# Patient Record
Sex: MALE | Race: WHITE | NOT HISPANIC OR LATINO | URBAN - METROPOLITAN AREA
[De-identification: names, ages, dates, MRNs, and addresses within clinical notes are randomized per-mention and may not be internally consistent; named-entity substitution may affect disease eponyms.]

---

## 2017-10-12 ENCOUNTER — TELEPHONE (OUTPATIENT)
Dept: TRANSPLANT | Facility: CLINIC | Age: 67
End: 2017-10-12

## 2017-10-12 NOTE — TELEPHONE ENCOUNTER
Unknown abo. Wants to think over PEP. Takes Ibuprofen occ-understands risks with eriberto use.Will now send all Phase 1 orders with donor pkt.

## 2017-10-12 NOTE — TELEPHONE ENCOUNTER
"[facility] BREEZE    [id number]     LIVING KIDNEY DONOR EVALUATION  Donor First Name Mike Donor MRN    Donor Last Name Remi Completed 10/9/2017 3:06 PM   1950 Record ID [id number]  BREEZE Screen PASSED      Intended Recipient  Recipient First Name Adelfo Correia Recipient MRN    Recipient Last Name Adarsh Relationship Close Friend  Recipient  1947 Recipient Diagnosis    Recipient's ABO        Donor Information  Age 66 Gender Male  Height 5' 11'' Race   Weight 205 Ethnicity Not /  BMI 28.6 Preferred Language English       Required No      Blood Type Unknown  Demographics  Home Address [address] # [phone]  City [place] Type Mobile  State [place] Alternate # [phone]  Zip Code [address] Type Mobile  Country [place] Preferred Contact day Tue  Email   Preferred Contact time 09:00 AM-11:00 AM  Donor's Medical Information  Medical History Headache (Non-Migraine)   Irritable Bowel Syndrome   Joint Pain/Arthritis NOS   Stress Tests, Normal    Torn Rotator Cuff   other (\"some neck pain sometimes\") Medications Ibuprofen  Surgical History FESS (Functional Endoscopic Sinus Surgery)   Surgery, Right Shoulder, NOS Allergies Celebrex : other (progressive dizziness)   Vioxx : other (dizziness)  Social History EtOH: Denies   Illicit Drug Use: Denies   Tobacco: Denies Self-Reported Functional Status \"I am able to participate in moderate recreational activities like golf, double tennis, dancing, throwing a baseball or football\"  Family Medical History Cancer (Grandparent)   Diabetes (denies)   Heart Disease (Father)   Hypertension (denies)   Kidney Disease (denies)   Kidney Stones (denies) Exercise Frequency Exercise (3 X per week)  Review of Organ Systems  Review of Systems Airway or Lungs: No   Blood Disorder: No   Cancer: No   Diabetes,Thyroid,Adrenal,Endocrine Disorder: No   Digestive or Liver: Yes   Heart or Circulatory System: No "   Immune Diseases: No   Kidneys and Bladder: No   Male Health: No   Muscles,Bones,Joints: Yes   Neuro: No   Psych: No  Donor's Social Information  Marital Status Single Living Accommodation Lives in rented accommodation  Level of 's or technical degree complete Living Arrangement Alone  Employment Status Part Time Concerns: health and life insurance No  Employer [employer] Concerns: job security and lost income No  Occupation        Medical Insurance Status Has medical insurance      High Risk Behavior  High Risk Behaviors Blood transfusion < 12 months. (NO)   Commercial sex < 12 months. (NO)   Illicit IV drug use < 5yrs. (NO)   Male:male sexual contact < 5yrs. (NO)   Other high risk sexual contact < 12 months. (NO)  Reason for Donation  Referral Tx Candidate Reason for Donation To help my boss Adelfo Altamirano.  Permission to Disclose Inquiry Yes Patient Comments You may let Dr. Sullivan know. I haven't told him I am willing to donate my kidney.  Donor Motivation Level Highly motivated donor      PCP Contact  PCP Name Dr. Josue Sullivan  PCP City [place] PCP State [place]  PCP Phone [phone]  Emergency Contact  First Name Adelfo First Name Dony  Last Name Adarsh Last Name dAarsh  Phone # [phone] Phone # [phone]  Relationship Friend or Other Relationship Friend or Other  Office Use  Reviewed By Jeannie Carlton 10/10/2017 8:26 AM  Admin Folder Archive  Comments Passed eval  Lost for Followup Not Checked  Extended Comments    BREEZE ID dre.transplant.combined:[email or web address].[id number] survey status completed

## 2017-10-24 DIAGNOSIS — Z00.5 TRANSPLANT DONOR EVALUATION: Primary | ICD-10-CM

## 2017-10-25 ENCOUNTER — TELEPHONE (OUTPATIENT)
Dept: TRANSPLANT | Facility: CLINIC | Age: 67
End: 2017-10-25

## 2017-10-25 ENCOUNTER — ALLIED HEALTH/NURSE VISIT (OUTPATIENT)
Dept: TRANSPLANT | Facility: CLINIC | Age: 67
End: 2017-10-25
Attending: SURGERY

## 2017-10-25 VITALS
RESPIRATION RATE: 20 BRPM | BODY MASS INDEX: 28.77 KG/M2 | HEIGHT: 70 IN | HEART RATE: 67 BPM | WEIGHT: 201 LBS | DIASTOLIC BLOOD PRESSURE: 94 MMHG | SYSTOLIC BLOOD PRESSURE: 163 MMHG

## 2017-10-25 DIAGNOSIS — Z00.5 TRANSPLANT DONOR EVALUATION: ICD-10-CM

## 2017-10-25 DIAGNOSIS — Z52.4 KIDNEY DONOR: Primary | ICD-10-CM

## 2017-10-25 LAB
ABO + RH BLD: NORMAL
ABO + RH BLD: NORMAL
ALBUMIN UR-MCNC: NEGATIVE MG/DL
APPEARANCE UR: CLEAR
BILIRUB UR QL STRIP: NEGATIVE
BLOOD BANK CMNT PATIENT-IMP: NORMAL
COLOR UR AUTO: YELLOW
CREAT SERPL-MCNC: 0.85 MG/DL (ref 0.66–1.25)
CREAT UR-MCNC: 129 MG/DL
GFR SERPL CREATININE-BSD FRML MDRD: >90 ML/MIN/1.7M2
GLUCOSE SERPL-MCNC: 86 MG/DL (ref 70–99)
GLUCOSE UR STRIP-MCNC: NEGATIVE MG/DL
HGB BLD-MCNC: 17.7 G/DL (ref 13.3–17.7)
HGB UR QL STRIP: NEGATIVE
KETONES UR STRIP-MCNC: NEGATIVE MG/DL
LEUKOCYTE ESTERASE UR QL STRIP: NEGATIVE
MICROALBUMIN UR-MCNC: 13 MG/L
MICROALBUMIN/CREAT UR: 9.77 MG/G CR (ref 0–17)
NITRATE UR QL: NEGATIVE
PH UR STRIP: 6 PH (ref 5–7)
PROT UR-MCNC: 0.08 G/L
PROT/CREAT 24H UR: 0.06 G/G CR (ref 0–0.2)
RBC #/AREA URNS AUTO: 1 /HPF (ref 0–2)
SOURCE: NORMAL
SP GR UR STRIP: 1.01 (ref 1–1.03)
SPECIMEN EXP DATE BLD: NORMAL
UROBILINOGEN UR STRIP-MCNC: 2 MG/DL (ref 0–2)
WBC #/AREA URNS AUTO: 1 /HPF (ref 0–2)

## 2017-10-25 PROCEDURE — 81001 URINALYSIS AUTO W/SCOPE: CPT | Performed by: TRANSPLANT SURGERY

## 2017-10-25 PROCEDURE — 86900 BLOOD TYPING SEROLOGIC ABO: CPT | Performed by: TRANSPLANT SURGERY

## 2017-10-25 PROCEDURE — 84156 ASSAY OF PROTEIN URINE: CPT | Performed by: TRANSPLANT SURGERY

## 2017-10-25 PROCEDURE — 82043 UR ALBUMIN QUANTITATIVE: CPT | Performed by: TRANSPLANT SURGERY

## 2017-10-25 PROCEDURE — 82565 ASSAY OF CREATININE: CPT | Performed by: TRANSPLANT SURGERY

## 2017-10-25 PROCEDURE — 85018 HEMOGLOBIN: CPT | Performed by: TRANSPLANT SURGERY

## 2017-10-25 PROCEDURE — 36415 COLL VENOUS BLD VENIPUNCTURE: CPT | Performed by: TRANSPLANT SURGERY

## 2017-10-25 PROCEDURE — 82947 ASSAY GLUCOSE BLOOD QUANT: CPT | Performed by: TRANSPLANT SURGERY

## 2017-11-01 ENCOUNTER — RECORDS - HEALTHEAST (OUTPATIENT)
Dept: LAB | Facility: CLINIC | Age: 67
End: 2017-11-01

## 2017-11-01 LAB
CHOLEST SERPL-MCNC: 211 MG/DL
FASTING STATUS PATIENT QL REPORTED: ABNORMAL
HDLC SERPL-MCNC: 48 MG/DL
LDLC SERPL CALC-MCNC: 146 MG/DL
TRIGL SERPL-MCNC: 84 MG/DL

## 2018-04-04 ENCOUNTER — TELEPHONE (OUTPATIENT)
Dept: TRANSPLANT | Facility: CLINIC | Age: 68
End: 2018-04-04

## 2018-04-04 NOTE — TELEPHONE ENCOUNTER
Checked on Mike. States since last call he went to PCP.  had tried at 1st 2 different HTN meds one at a time and neither worked. Then put him on Losartan 100mg qd for past 3 months. He's kep a log of his BP's. States his average BP's are 140's/68. Now sent Email message to Dr Tyson to see if he has any suggestions.

## 2018-04-05 ENCOUNTER — TELEPHONE (OUTPATIENT)
Dept: TRANSPLANT | Facility: CLINIC | Age: 68
End: 2018-04-05

## 2018-04-10 ENCOUNTER — RESULTS ONLY (OUTPATIENT)
Dept: OTHER | Facility: CLINIC | Age: 68
End: 2018-04-10

## 2018-04-16 LAB
A* LOCUS NMDP: NORMAL
A* LOCUS: NORMAL
A* NMDP: NORMAL
ABTEST METHOD: NORMAL
B* LOCUS NMDP: NORMAL
B* LOCUS: NORMAL
B* NMDP: NORMAL
B*: NORMAL
BW-1: NORMAL
BW-2: NORMAL
C* LOCUS NMDP: NORMAL
C* LOCUS: NORMAL
C* NMDP: NORMAL
C*: NORMAL
DPA1* NMDP: NORMAL
DPA1*: NORMAL
DPB1* NMDP: NORMAL
DPB1*: NORMAL
DQA1*LOCUS: NORMAL
DQB1* LOCUS NMDP: NORMAL
DQB1* LOCUS: NORMAL
DQB1* NMDP: NORMAL
DQB1*: NORMAL
DRB1* LOCUS NMDP: NORMAL
DRB1* LOCUS: NORMAL
DRB1* NMDP: NORMAL
DRB4* LOCUS NMDP: NORMAL
DRB4* LOCUS: NORMAL
DRB4*: NORMAL
DRSSO TEST METHOD: NORMAL

## 2018-04-20 ENCOUNTER — TELEPHONE (OUTPATIENT)
Dept: TRANSPLANT | Facility: CLINIC | Age: 68
End: 2018-04-20

## 2018-04-20 DIAGNOSIS — Z00.5 TRANSPLANT DONOR EVALUATION: Primary | ICD-10-CM

## 2018-04-20 NOTE — TELEPHONE ENCOUNTER
Mike called to say that he would l mary to be scheduled for a paired exchange eval.  I will ask Courtney to schedule

## 2018-05-10 ENCOUNTER — OFFICE VISIT (OUTPATIENT)
Dept: NEPHROLOGY | Facility: CLINIC | Age: 68
End: 2018-05-10
Attending: NURSE PRACTITIONER

## 2018-05-10 ENCOUNTER — ALLIED HEALTH/NURSE VISIT (OUTPATIENT)
Dept: TRANSPLANT | Facility: CLINIC | Age: 68
End: 2018-05-10
Attending: NURSE PRACTITIONER

## 2018-05-10 ENCOUNTER — INFUSION THERAPY VISIT (OUTPATIENT)
Dept: INFUSION THERAPY | Facility: CLINIC | Age: 68
End: 2018-05-10
Attending: INTERNAL MEDICINE

## 2018-05-10 ENCOUNTER — TRANSFERRED RECORDS (OUTPATIENT)
Dept: HEALTH INFORMATION MANAGEMENT | Facility: CLINIC | Age: 68
End: 2018-05-10

## 2018-05-10 VITALS
HEIGHT: 70 IN | WEIGHT: 214 LBS | TEMPERATURE: 97.8 F | HEART RATE: 61 BPM | RESPIRATION RATE: 20 BRPM | SYSTOLIC BLOOD PRESSURE: 159 MMHG | BODY MASS INDEX: 30.64 KG/M2 | DIASTOLIC BLOOD PRESSURE: 92 MMHG | OXYGEN SATURATION: 99 %

## 2018-05-10 DIAGNOSIS — Z00.5 TRANSPLANT DONOR EVALUATION: Primary | ICD-10-CM

## 2018-05-10 DIAGNOSIS — Z00.5 TRANSPLANT DONOR EVALUATION: ICD-10-CM

## 2018-05-10 DIAGNOSIS — Z53.9 ERRONEOUS ENCOUNTER--DISREGARD: Primary | ICD-10-CM

## 2018-05-10 DIAGNOSIS — Z00.5 EXAMINATION OF POTENTIAL DONOR OF ORGAN AND TISSUE: Primary | ICD-10-CM

## 2018-05-10 LAB
ABO + RH BLD: NORMAL
ABO + RH BLD: NORMAL
ALBUMIN SERPL-MCNC: 3.6 G/DL (ref 3.4–5)
ALP SERPL-CCNC: 104 U/L (ref 40–150)
ALT SERPL W P-5'-P-CCNC: 27 U/L (ref 0–70)
ANION GAP SERPL CALCULATED.3IONS-SCNC: 7 MMOL/L (ref 3–14)
APTT PPP: 32 SEC (ref 22–37)
AST SERPL W P-5'-P-CCNC: 24 U/L (ref 0–45)
BILIRUB DIRECT SERPL-MCNC: 0.1 MG/DL (ref 0–0.2)
BILIRUB SERPL-MCNC: 0.8 MG/DL (ref 0.2–1.3)
BLD GP AB SCN SERPL QL: NORMAL
BLOOD BANK CMNT PATIENT-IMP: NORMAL
BUN SERPL-MCNC: 14 MG/DL (ref 7–30)
CALCIUM SERPL-MCNC: 8.8 MG/DL (ref 8.5–10.1)
CHLORIDE SERPL-SCNC: 103 MMOL/L (ref 94–109)
CHOLEST SERPL-MCNC: 189 MG/DL
CO2 SERPL-SCNC: 26 MMOL/L (ref 20–32)
CREAT SERPL-MCNC: 0.93 MG/DL (ref 0.66–1.25)
ERYTHROCYTE [DISTWIDTH] IN BLOOD BY AUTOMATED COUNT: 11.8 % (ref 10–15)
GFR SERPL CREATININE-BSD FRML MDRD: 81 ML/MIN/1.7M2
GLUCOSE SERPL-MCNC: 90 MG/DL (ref 70–99)
HBA1C MFR BLD: 5.4 % (ref 0–5.6)
HCT VFR BLD AUTO: 51 % (ref 40–53)
HDLC SERPL-MCNC: 39 MG/DL
HGB BLD-MCNC: 17.9 G/DL (ref 13.3–17.7)
INR PPP: 1.05 (ref 0.86–1.14)
LDLC SERPL CALC-MCNC: 123 MG/DL
MCH RBC QN AUTO: 32.4 PG (ref 26.5–33)
MCHC RBC AUTO-ENTMCNC: 35.1 G/DL (ref 31.5–36.5)
MCV RBC AUTO: 92 FL (ref 78–100)
NONHDLC SERPL-MCNC: 150 MG/DL
PHOSPHATE SERPL-MCNC: 2.1 MG/DL (ref 2.5–4.5)
PLATELET # BLD AUTO: 218 10E9/L (ref 150–450)
POTASSIUM SERPL-SCNC: 3.9 MMOL/L (ref 3.4–5.3)
PROT SERPL-MCNC: 7.1 G/DL (ref 6.8–8.8)
PSA SERPL-ACNC: 2.9 UG/L (ref 0–4)
RBC # BLD AUTO: 5.52 10E12/L (ref 4.4–5.9)
SODIUM SERPL-SCNC: 135 MMOL/L (ref 133–144)
SPECIMEN EXP DATE BLD: NORMAL
TRIGL SERPL-MCNC: 133 MG/DL
URATE SERPL-MCNC: 3.8 MG/DL (ref 3.5–7.2)
WBC # BLD AUTO: 6.9 10E9/L (ref 4–11)

## 2018-05-10 PROCEDURE — 86901 BLOOD TYPING SEROLOGIC RH(D): CPT | Performed by: INTERNAL MEDICINE

## 2018-05-10 PROCEDURE — 86850 RBC ANTIBODY SCREEN: CPT | Performed by: INTERNAL MEDICINE

## 2018-05-10 PROCEDURE — 25000128 H RX IP 250 OP 636: Mod: ZF | Performed by: INTERNAL MEDICINE

## 2018-05-10 PROCEDURE — 80053 COMPREHEN METABOLIC PANEL: CPT | Performed by: INTERNAL MEDICINE

## 2018-05-10 PROCEDURE — 86900 BLOOD TYPING SEROLOGIC ABO: CPT | Performed by: INTERNAL MEDICINE

## 2018-05-10 RX ADMIN — IOHEXOL 5 ML: 300 INJECTION, SOLUTION INTRAVENOUS at 09:54

## 2018-05-10 ASSESSMENT — PAIN SCALES - GENERAL: PAINLEVEL: NO PAIN (0)

## 2018-05-10 NOTE — LETTER
5/10/2018       RE: Mike Khalil  [address]  [facility] [address]     Dear Colleague,    Thank you for referring your patient, Mike Khalil, to the [facility] NEPHROLOGY at [facility]. Please see a copy of my visit note below.    The patient's blood pressure remains elevated today despite his evaluation by his primary care provider.  He is on sure of what medicine he is taking for his blood pressure today.  I recommended that he return to the care of his PCP for management of his blood pressure.  He feels that he may have missed his blood pressure medication yesterday and that is the reason that his blood pressure is elevated today.  He will need to at least be able to know the names of his medicines and doses, not forget his medicines and have 100% adherence with it, and have a blood pressure at least less than 140/90 on one medication prior to him being considered for being a kidney donor.    I spoke at length with this patient regarding this and he states that he has understanding of why he is being turned down for kidney donation today.    No charge for this visit today.        Again, thank you for allowing me to participate in the care of your patient.      Sincerely,    Markie Tyson MD

## 2018-05-10 NOTE — PROGRESS NOTES
The patient's blood pressure remains elevated today despite his evaluation by his primary care provider.  He is on sure of what medicine he is taking for his blood pressure today.  I recommended that he return to the care of his PCP for management of his blood pressure.  He feels that he may have missed his blood pressure medication yesterday and that is the reason that his blood pressure is elevated today.  He will need to at least be able to know the names of his medicines and doses, not forget his medicines and have 100% adherence with it, and have a blood pressure at least less than 140/90 on one medication prior to him being considered for being a kidney donor.    I spoke at length with this patient regarding this and he states that he has understanding of why he is being turned down for kidney donation today.    No charge for this visit today.

## 2018-05-10 NOTE — MR AVS SNAPSHOT
"              After Visit Summary   5/10/2018    Mike Khalil    MRN: [id number]           Patient Information     Date Of Birth          1950        Visit Information        Provider Department      5/10/2018 8:30 AM UC 47 ATC; UC SPEC INFUSION [facility] Specialty and Procedure        Today's Diagnoses     Transplant donor evaluation    -  1       Follow-ups after your visit        Who to contact     If you have questions or need follow up information about today's clinic visit or your schedule please contact [facility] SPECIALTY AND PROCEDURE directly at [phone].  Normal or non-critical lab and imaging results will be communicated to you by SiCortexhart, letter or phone within 4 business days after the clinic has received the results. If you do not hear from us within 7 days, please contact the clinic through [facility] or phone. If you have a critical or abnormal lab result, we will notify you by phone as soon as possible.  Submit refill requests through [employer] or call your pharmacy and they will forward the refill request to us. Please allow 3 business days for your refill to be completed.          Additional Information About Your Visit        MyChart Information     [employer] lets you send messages to your doctor, view your test results, renew your prescriptions, schedule appointments and more. To sign up, go to [email or web address] . Click on \"Log in\" on the left side of the screen, which will take you to the Welcome page. Then click on \"Sign up Now\" on the right side of the page.     You will be asked to enter the access code listed below, as well as some personal information. Please follow the directions to create your username and password.     Your access code is: [email or web address]  Expires: 2018  6:30 AM     Your access code will  in 90 days. If you need help or a new code, please call your [place] clinic or [phone].        Care " EveryWhere ID     This is your Care EveryWhere ID. This could be used by other organizations to access your [place] medical records  [id number]         Blood Pressure from Last 3 Encounters:   05/10/18 (!) 159/92   10/25/17 (!) 163/94    Weight from Last 3 Encounters:   05/10/18 97.1 kg (214 lb)   10/25/17 91.2 kg (201 lb)              We Performed the Following     ABO/Rh type and screen     Bilirubin direct     CBC with platelets     Comprehensive metabolic panel     Hemoglobin A1c     INR     Lipid Profile     Partial thromboplastin time     Phosphorus     Prostate spec antigen screen     Uric acid        Primary Care Provider Fax #    Provider Not In System [phone]                Equal Access to Services     ANDREA LAWS : Hadii leah Maldonado, maykel cha, anmol lawsonalmaemmanuel cartagena, jamaal rico. So [facility] [phone].    ATENCIÓN: Si habla español, tiene a ward disposición servicios gratuitos de asistencia lingüística. [facility] [phone].    We comply with applicable federal civil rights laws and [place] laws. We do not discriminate on the basis of race, color, national origin, age, disability, sex, sexual orientation, or gender identity.            Thank you!     Thank you for choosing [facility] SPECIALTY AND PROCEDURE  for your care. Our goal is always to provide you with excellent care. Hearing back from our patients is one way we can continue to improve our services. Please take a few minutes to complete the written survey that you may receive in the mail after your visit with us. Thank you!             Your Updated Medication List - Protect others around you: Learn how to safely use, store and throw away your medicines at www.disposemymeds.org.      Notice  As of 5/10/2018  4:49 PM    You have not been prescribed any medications.

## 2018-05-10 NOTE — MR AVS SNAPSHOT
"              After Visit Summary   5/10/2018    Mike Khalil    MRN: [id number]           Patient Information     Date Of Birth          1950        Visit Information        Provider Department      5/10/2018 10:00 AM Nimo Alcocer S.A., RN [facility] Solid Organ Transplant        Today's Diagnoses     Examination of potential donor of organ and tissue    -  1       Follow-ups after your visit        Who to contact     If you have questions or need follow up information about today's clinic visit or your schedule please contact [facility] SOLID ORGAN TRANSPLANT directly at [phone].  Normal or non-critical lab and imaging results will be communicated to you by R-Healthhart, letter or phone within 4 business days after the clinic has received the results. If you do not hear from us within 7 days, please contact the clinic through [employer]-Healthhart or phone. If you have a critical or abnormal lab result, we will notify you by phone as soon as possible.  Submit refill requests through [employer] or call your pharmacy and they will forward the refill request to us. Please allow 3 business days for your refill to be completed.          Additional Information About Your Visit        MyChart Information     [employer] lets you send messages to your doctor, view your test results, renew your prescriptions, schedule appointments and more. To sign up, go to [email or web address] . Click on \"Log in\" on the left side of the screen, which will take you to the Welcome page. Then click on \"Sign up Now\" on the right side of the page.     You will be asked to enter the access code listed below, as well as some personal information. Please follow the directions to create your username and password.     Your access code is: [email or web address]  Expires: 2018  6:30 AM     Your access code will  in 90 days. If you need help or a new code, please call your [place] clinic or [phone].        Care EveryWhere ID     This is your Care " EveryWhere ID. This could be used by other organizations to access your [place] medical records  [id number]         Blood Pressure from Last 3 Encounters:   05/10/18 (!) 159/92   10/25/17 (!) 163/94    Weight from Last 3 Encounters:   05/10/18 97.1 kg (214 lb)   10/25/17 91.2 kg (201 lb)              Today, you had the following     No orders found for display       Primary Care Provider Fax #    Provider Not In System [phone]                Equal Access to Services     ANDREA LAWS : Hadii aad ku hadasho Soomaali, waaxda luqadaha, qaybta kaalmada adeegyada, jamaal jones . So [facility] [phone].    ATENCIÓN: Si habla español, tiene a ward disposición servicios gratuitos de asistencia lingüística. Llame al [phone].    We comply with applicable federal civil rights laws and [place] laws. We do not discriminate on the basis of race, color, national origin, age, disability, sex, sexual orientation, or gender identity.            Thank you!     Thank you for choosing [facility] SOLID ORGAN TRANSPLANT  for your care. Our goal is always to provide you with excellent care. Hearing back from our patients is one way we can continue to improve our services. Please take a few minutes to complete the written survey that you may receive in the mail after your visit with us. Thank you!             Your Updated Medication List - Protect others around you: Learn how to safely use, store and throw away your medicines at www.disposemymeds.org.      Notice  As of 5/10/2018  5:23 PM    You have not been prescribed any medications.

## 2018-05-10 NOTE — PROGRESS NOTES
Donor Iohexol test    Mike Khalil presents today to [facility] for a Donor Iohexol test.      Progress note:  ID verified by name and .     The following information was verified with the patient:  Female Patients is there any possibility of being pregnant No  Is there a history of allergy (skin rash, swelling, ect) to:   A.  Iodine (except skin reactions to betadine): No   B. Intravenous radio-contrast agents: No   C. Seafood No    R.N. provided patient with educational handout regarding timed test. Yes    20 gauge PIV placed in LT AC.  Iohexol administered.  Following iohexol administration extension set replaced.  PIV left in place for blood draws and CT this afternoon    Medication administered:  Iohexol (Omnipaque 300mg iodine/ml concentration) 5 mls.    Start time: 854  Stop time: 856    Drug Waste Record    Drug Name: Iohexol  Dose: 5 ml  Route administered: IV  NDC #: [id number]  Amount of waste(mL):5 ml  Reason for waste: Single use vial      Administrations This Visit     iohexol (OMNIPAQUE 300) injection 5 mL     Admin Date Action Dose Route Administered By             05/10/2018 Given 5 mL Intravenous Dodie Olmstead RN                            Evaluation nurse in transplant to draw labs at 2 and 4 hours post iohexol administration.  Patient given a slip with the times to get labs drawn and verbalized understanding of the plan.    Patient tolerated the procedure:  Yes    After the infusion patient was discharged to the next appointment.    Dodie Olmstead RN

## 2018-05-10 NOTE — NURSING NOTE
Saw Mike Khalil in clinic during kidney donor evaluation.  Has offered to be donor to Paired exchange program for friend Adelfo Altamirano.  They are incompatible by DSA.  His blood pressure readings today in clinic were elevated.  Dr Tyson met with the patient and the evaluation was stopped.  I visited with him for 20 minutes and reviewed next steps.  He will go to primary care for management of Hypertension.  He will need to demonstrate stability on the medication.  When that is completed we will schedule him back in the clinic for his donor evaluation.  The patient signed the ehFriendFinder Networksth EVANGELIST for his PCP.  I will touch base with the provider that our team has recommended that he receive treatment and demonstrate stable blood pressures.  All tests today were cancelled.    Time Spent 20 minutes.

## 2018-05-10 NOTE — MR AVS SNAPSHOT
"              After Visit Summary   5/10/2018    Mike Khalil    MRN: [id number]           Patient Information     Date Of Birth          1950        Visit Information        Provider Department      5/10/2018 1:00 PM Markie Tyson MD [facility] Nephrology        Today's Diagnoses     ERRONEOUS ENCOUNTER--DISREGARD    -  1       Follow-ups after your visit        Your next 10 appointments already scheduled     May 10, 2018 10:00 AM CDT   (Arrive by 9:45 AM)   SOT CARE COORDINATOR EVAL with Caryl Brush RN   [facility] Solid Organ Transplant ([facility])    [address] [place] [address]  [address]  [facility] [address] [phone]            May 10, 2018  1:00 PM CDT   (Arrive by 12:30 PM)   Kidney Donor Evaluation with Markie Tyson MD   [facility] Nephrology ([facility])    [address] [place] [address]  [address]  [facility] [address] [phone]              Who to contact     If you have questions or need follow up information about today's clinic visit or your schedule please contact [facility] NEPHROLOGY directly at [phone].  Normal or non-critical lab and imaging results will be communicated to you by MyChart, letter or phone within 4 business days after the clinic has received the results. If you do not hear from us within 7 days, please contact the clinic through [employer] or phone. If you have a critical or abnormal lab result, we will notify you by phone as soon as possible.  Submit refill requests through Z[employer]is or call your pharmacy and they will forward the refill request to us. Please allow 3 business days for your refill to be completed.          Additional Information About Your Visit        MyChart Information     [employer] lets you send messages to your doctor, view your test results, renew your prescriptions, schedule appointments and more. To sign up, go to [email or web address] . Click on \"Log in\" on the left side of the " "screen, which will take you to the Welcome page. Then click on \"Sign up Now\" on the right side of the page.     You will be asked to enter the access code listed below, as well as some personal information. Please follow the directions to create your username and password.     Your access code is: [email or web address]  Expires: 2018  6:30 AM     Your access code will  in 90 days. If you need help or a new code, please call your [place] clinic or [phone].        Care EveryWhere ID     This is your Care EveryWhere ID. This could be used by other organizations to access your [place] medical records  [id number]        Your Vitals Were     Pulse Temperature Respirations Height Pulse Oximetry BMI (Body Mass Index)    61 97.8  F (36.6  C) (Oral) 20 1.778 m (5' 10\") 99% 30.71 kg/m2       Blood Pressure from Last 3 Encounters:   05/10/18 (!) 159/92   10/25/17 (!) 163/94    Weight from Last 3 Encounters:   05/10/18 97.1 kg (214 lb)   10/25/17 91.2 kg (201 lb)              Today, you had the following     No orders found for display       Primary Care Provider Fax #    Provider Not In System [phone]                Equal Access to Services     PRIYA [facility] : Hadii leah ku hadasho Soomaali, waaxda luqadaha, qaybta kaalmada adeegyada, jamaal jones . So [facility] [phone].    ATENCIÓN: Si habla español, tiene a ward disposición servicios gratuitos de asistencia lingüística. Llame al [phone].    We comply with applicable federal civil rights laws and [place] laws. We do not discriminate on the basis of race, color, national origin, age, disability, sex, sexual orientation, or gender identity.            Thank you!     Thank you for choosing [facility] NEPHROLOGY  for your care. Our goal is always to provide you with excellent care. Hearing back from our patients is one way we can continue to improve our services. Please take a few minutes to complete the written survey that you may " receive in the mail after your visit with us. Thank you!             Your Updated Medication List - Protect others around you: Learn how to safely use, store and throw away your medicines at www.disposemymeds.org.      Notice  As of 5/10/2018  9:51 AM    You have not been prescribed any medications.

## 2018-05-10 NOTE — LETTER
Date:May 11, 2018      Patient was self referred, no letter generated. Do not send.        [facility] Health Information

## 2018-05-16 ENCOUNTER — COMMITTEE REVIEW (OUTPATIENT)
Dept: TRANSPLANT | Facility: CLINIC | Age: 68
End: 2018-05-16

## 2018-05-16 NOTE — COMMITTEE REVIEW
Living Donor Committee Review Note Evaluation Date: 5/10/2018  Committee Review Date: 5/16/2018    Donor being evaluated for: Kidney    Transplant Phase: Evaluation  Transplant Status: Active    Transplant Coordinator: Nimo Alcocer  Transplant Surgeon:       Committee Review Members:  Racheal, Ekta Arana RD   Nephrology Markie Tyson MD   Pharmacy Pito Moreno, [facility]    - Clinical Laura Anderson, [facility], Shahla Fine, [facility]   Transplant Caryl Brush RN, Yony Crabtree MD, Kaleigh Singh, LUIS, Aliya Schulte, N, Nimo Alcocer RN, Adelfo Mahan MD       Transplant Eligibility:     Committee Review Decision: Declined    Relative Contraindications:     Absolute Contraindications: Uncontrollable hypertension or hx of hypertension w/evidence end stage organ damage    Committee Chair Yony Crabtree MD verbally attested to the committee's decision.    Committee Discussion Details: Elevated Blood pressures on evaluation day.  History of HTN and was on one anti hypertensive medication but poor adherence was reported by the patient.  The Donor team recommends that he see PCP for Hypertension management and demonstrate stable blood pressures prior to restarting donor evaluation.  Pt is declined to be a donor.

## 2018-05-17 ENCOUNTER — TELEPHONE (OUTPATIENT)
Dept: TRANSPLANT | Facility: CLINIC | Age: 68
End: 2018-05-17

## 2018-05-17 NOTE — TELEPHONE ENCOUNTER
I called the office of Dr Sullivan to report the hypertensive readings that were obtained during the patient's evaluation day.  I explained the policy for kidney donors and that we now ask that their clinic work with patient on hypertension management.  The patient presented, she said to their clinic on 5/10/18 and a blood pressure was obtained there that was 138/78.  The nurse will inform the PCP.  I gave the nurse my contact information.

## 2019-08-05 ENCOUNTER — RECORDS - HEALTHEAST (OUTPATIENT)
Dept: LAB | Facility: CLINIC | Age: 69
End: 2019-08-05

## 2019-08-05 LAB
ALBUMIN SERPL-MCNC: 4.3 G/DL (ref 3.5–5)
ALP SERPL-CCNC: 101 U/L (ref 45–120)
ALT SERPL W P-5'-P-CCNC: 37 U/L (ref 0–45)
ANION GAP SERPL CALCULATED.3IONS-SCNC: 14 MMOL/L (ref 5–18)
AST SERPL W P-5'-P-CCNC: 41 U/L (ref 0–40)
BILIRUB SERPL-MCNC: 1 MG/DL (ref 0–1)
BUN SERPL-MCNC: 21 MG/DL (ref 8–22)
CALCIUM SERPL-MCNC: 10.2 MG/DL (ref 8.5–10.5)
CHLORIDE BLD-SCNC: 106 MMOL/L (ref 98–107)
CHOLEST SERPL-MCNC: 231 MG/DL
CO2 SERPL-SCNC: 21 MMOL/L (ref 22–31)
CREAT SERPL-MCNC: 0.95 MG/DL (ref 0.7–1.3)
FASTING STATUS PATIENT QL REPORTED: ABNORMAL
GFR SERPL CREATININE-BSD FRML MDRD: >60 ML/MIN/1.73M2
GLUCOSE BLD-MCNC: 91 MG/DL (ref 70–125)
HDLC SERPL-MCNC: 51 MG/DL
LDLC SERPL CALC-MCNC: 156 MG/DL
POTASSIUM BLD-SCNC: 4.5 MMOL/L (ref 3.5–5)
PROT SERPL-MCNC: 7.3 G/DL (ref 6–8)
PSA SERPL-MCNC: 3.2 NG/ML (ref 0–4.5)
SODIUM SERPL-SCNC: 141 MMOL/L (ref 136–145)
TRIGL SERPL-MCNC: 119 MG/DL

## 2019-08-06 ENCOUNTER — RECORDS - HEALTHEAST (OUTPATIENT)
Dept: LAB | Facility: CLINIC | Age: 69
End: 2019-08-06

## 2019-08-06 LAB
TSH SERPL DL<=0.005 MIU/L-ACNC: 1.18 UIU/ML (ref 0.3–5)
VIT B12 SERPL-MCNC: 294 PG/ML (ref 213–816)

## 2019-09-06 ENCOUNTER — RECORDS - HEALTHEAST (OUTPATIENT)
Dept: LAB | Facility: HOSPITAL | Age: 69
End: 2019-09-06

## 2019-09-06 ENCOUNTER — RECORDS - HEALTHEAST (OUTPATIENT)
Dept: ADMINISTRATIVE | Facility: OTHER | Age: 69
End: 2019-09-06

## 2019-09-08 LAB — METHYLMALONATE SERPL-SCNC: 0.22 UMOL/L (ref 0–0.4)

## 2019-09-09 ENCOUNTER — RECORDS - HEALTHEAST (OUTPATIENT)
Dept: ADMINISTRATIVE | Facility: OTHER | Age: 69
End: 2019-09-09

## 2019-09-09 ENCOUNTER — AMBULATORY - HEALTHEAST (OUTPATIENT)
Dept: ADMINISTRATIVE | Facility: REHABILITATION | Age: 69
End: 2019-09-09

## 2019-09-09 DIAGNOSIS — H81.10 BPPV (BENIGN PAROXYSMAL POSITIONAL VERTIGO): ICD-10-CM

## 2019-09-09 LAB — VIT B1 PYROPHOSHATE BLD-SCNC: 155 NMOL/L (ref 70–180)

## 2019-09-11 ENCOUNTER — AMBULATORY - HEALTHEAST (OUTPATIENT)
Dept: NEUROLOGY | Facility: CLINIC | Age: 69
End: 2019-09-11

## 2019-09-11 ENCOUNTER — HOSPITAL ENCOUNTER (OUTPATIENT)
Dept: MRI IMAGING | Facility: HOSPITAL | Age: 69
Discharge: HOME | End: 2019-09-11
Attending: PSYCHIATRY & NEUROLOGY

## 2019-09-11 DIAGNOSIS — R48.2 GAIT APRAXIA: ICD-10-CM

## 2019-09-11 DIAGNOSIS — R41.89 SUBJECTIVE MEMORY COMPLAINTS: ICD-10-CM

## 2019-09-11 DIAGNOSIS — H81.10 BPPV (BENIGN PAROXYSMAL POSITIONAL VERTIGO): ICD-10-CM

## 2019-09-11 DIAGNOSIS — H81.10: ICD-10-CM

## 2019-09-26 ENCOUNTER — AMBULATORY - HEALTHEAST (OUTPATIENT)
Dept: BEHAVIORAL HEALTH | Facility: CLINIC | Age: 69
End: 2019-09-26

## 2019-09-26 DIAGNOSIS — R41.89 SUBJECTIVE MEMORY COMPLAINTS: ICD-10-CM

## 2019-09-26 DIAGNOSIS — F03.90: ICD-10-CM

## 2019-09-26 DIAGNOSIS — H81.10: ICD-10-CM

## 2019-09-26 DIAGNOSIS — R48.2 GAIT APRAXIA: ICD-10-CM

## 2019-09-30 ENCOUNTER — HOSPITAL ENCOUNTER (OUTPATIENT)
Dept: NEUROLOGY | Facility: HOSPITAL | Age: 69
Discharge: HOME | End: 2019-09-30
Attending: PSYCHIATRY & NEUROLOGY

## 2019-09-30 DIAGNOSIS — R41.89 SUBJECTIVE MEMORY COMPLAINTS: ICD-10-CM

## 2019-09-30 DIAGNOSIS — H81.10 BENIGN PAROXYSMAL POSITIONAL VERTIGO, UNSPECIFIED LATERALITY: ICD-10-CM

## 2019-10-02 ENCOUNTER — AMBULATORY - HEALTHEAST (OUTPATIENT)
Dept: BEHAVIORAL HEALTH | Facility: CLINIC | Age: 69
End: 2019-10-02

## 2019-10-02 DIAGNOSIS — F03.90: ICD-10-CM

## 2020-02-21 ENCOUNTER — RECORDS - HEALTHEAST (OUTPATIENT)
Dept: LAB | Facility: CLINIC | Age: 70
End: 2020-02-21

## 2020-02-21 LAB
ALBUMIN SERPL-MCNC: 3.5 G/DL (ref 3.5–5)
ALP SERPL-CCNC: 106 U/L (ref 45–120)
ALT SERPL W P-5'-P-CCNC: 27 U/L (ref 0–45)
ANION GAP SERPL CALCULATED.3IONS-SCNC: 8 MMOL/L (ref 5–18)
AST SERPL W P-5'-P-CCNC: 23 U/L (ref 0–40)
BASOPHILS # BLD AUTO: 0 THOU/UL (ref 0–0.2)
BASOPHILS NFR BLD AUTO: 0 % (ref 0–2)
BILIRUB SERPL-MCNC: 0.8 MG/DL (ref 0–1)
BUN SERPL-MCNC: 16 MG/DL (ref 8–22)
CALCIUM SERPL-MCNC: 9.5 MG/DL (ref 8.5–10.5)
CHLORIDE BLD-SCNC: 102 MMOL/L (ref 98–107)
CO2 SERPL-SCNC: 27 MMOL/L (ref 22–31)
CREAT SERPL-MCNC: 0.83 MG/DL (ref 0.7–1.3)
EOSINOPHIL # BLD AUTO: 0.2 THOU/UL (ref 0–0.4)
EOSINOPHIL NFR BLD AUTO: 1 % (ref 0–6)
ERYTHROCYTE [DISTWIDTH] IN BLOOD BY AUTOMATED COUNT: 11.9 % (ref 11–14.5)
GFR SERPL CREATININE-BSD FRML MDRD: >60 ML/MIN/1.73M2
GLUCOSE BLD-MCNC: 101 MG/DL (ref 70–125)
HCT VFR BLD AUTO: 54 % (ref 40–54)
HGB BLD-MCNC: 18.2 G/DL (ref 14–18)
LYMPHOCYTES # BLD AUTO: 1.8 THOU/UL (ref 0.8–4.4)
LYMPHOCYTES NFR BLD AUTO: 17 % (ref 20–40)
MCH RBC QN AUTO: 32.8 PG (ref 27–34)
MCHC RBC AUTO-ENTMCNC: 33.7 G/DL (ref 32–36)
MCV RBC AUTO: 97 FL (ref 80–100)
MONOCYTES # BLD AUTO: 0.6 THOU/UL (ref 0–0.9)
MONOCYTES NFR BLD AUTO: 6 % (ref 2–10)
NEUTROPHILS # BLD AUTO: 8 THOU/UL (ref 2–7.7)
NEUTROPHILS NFR BLD AUTO: 75 % (ref 50–70)
PLATELET # BLD AUTO: 264 THOU/UL (ref 140–440)
PMV BLD AUTO: 10.6 FL (ref 8.5–12.5)
POTASSIUM BLD-SCNC: 4.3 MMOL/L (ref 3.5–5)
PROT SERPL-MCNC: 6.8 G/DL (ref 6–8)
RBC # BLD AUTO: 5.55 MILL/UL (ref 4.4–6.2)
SODIUM SERPL-SCNC: 137 MMOL/L (ref 136–145)
WBC: 10.6 THOU/UL (ref 4–11)

## 2020-03-02 ENCOUNTER — RECORDS - HEALTHEAST (OUTPATIENT)
Dept: LAB | Facility: CLINIC | Age: 70
End: 2020-03-02

## 2020-03-02 LAB
ERYTHROCYTE [DISTWIDTH] IN BLOOD BY AUTOMATED COUNT: 11.7 % (ref 11–14.5)
FERRITIN SERPL-MCNC: 313 NG/ML (ref 27–300)
HCT VFR BLD AUTO: 52.3 % (ref 40–54)
HGB BLD-MCNC: 17.4 G/DL (ref 14–18)
MCH RBC QN AUTO: 33 PG (ref 27–34)
MCHC RBC AUTO-ENTMCNC: 33.3 G/DL (ref 32–36)
MCV RBC AUTO: 99 FL (ref 80–100)
PLATELET # BLD AUTO: 241 THOU/UL (ref 140–440)
PMV BLD AUTO: 11.1 FL (ref 8.5–12.5)
RBC # BLD AUTO: 5.27 MILL/UL (ref 4.4–6.2)
WBC: 7.6 THOU/UL (ref 4–11)

## 2020-03-03 ENCOUNTER — RECORDS - HEALTHEAST (OUTPATIENT)
Dept: LAB | Facility: CLINIC | Age: 70
End: 2020-03-03

## 2020-03-10 LAB — JAK2 MUTATION NGS - HISTORICAL: NORMAL

## 2020-04-03 ENCOUNTER — RECORDS - HEALTHEAST (OUTPATIENT)
Dept: LAB | Facility: CLINIC | Age: 70
End: 2020-04-03

## 2020-04-06 LAB
BASOPHILS # BLD AUTO: 0 THOU/UL (ref 0–0.2)
BASOPHILS NFR BLD AUTO: 1 % (ref 0–2)
EOSINOPHIL # BLD AUTO: 0.1 THOU/UL (ref 0–0.4)
EOSINOPHIL NFR BLD AUTO: 2 % (ref 0–6)
ERYTHROCYTE [DISTWIDTH] IN BLOOD BY AUTOMATED COUNT: 11.7 % (ref 11–14.5)
HCT VFR BLD AUTO: 53.5 % (ref 40–54)
HGB BLD-MCNC: 17.9 G/DL (ref 14–18)
LYMPHOCYTES # BLD AUTO: 1.8 THOU/UL (ref 0.8–4.4)
LYMPHOCYTES NFR BLD AUTO: 25 % (ref 20–40)
MCH RBC QN AUTO: 32.4 PG (ref 27–34)
MCHC RBC AUTO-ENTMCNC: 33.5 G/DL (ref 32–36)
MCV RBC AUTO: 97 FL (ref 80–100)
MONOCYTES # BLD AUTO: 0.4 THOU/UL (ref 0–0.9)
MONOCYTES NFR BLD AUTO: 6 % (ref 2–10)
NEUTROPHILS # BLD AUTO: 4.6 THOU/UL (ref 2–7.7)
NEUTROPHILS NFR BLD AUTO: 66 % (ref 50–70)
PLATELET # BLD AUTO: 285 THOU/UL (ref 140–440)
PMV BLD AUTO: 10.1 FL (ref 8.5–12.5)
RBC # BLD AUTO: 5.53 MILL/UL (ref 4.4–6.2)
WBC: 7 THOU/UL (ref 4–11)

## 2020-05-14 ENCOUNTER — RECORDS - HEALTHEAST (OUTPATIENT)
Dept: LAB | Facility: CLINIC | Age: 70
End: 2020-05-14

## 2020-05-15 LAB — ALBUMIN SERPL-MCNC: 3.8 G/DL (ref 3.5–5)

## 2020-07-19 ENCOUNTER — RECORDS - HEALTHEAST (OUTPATIENT)
Dept: LAB | Facility: CLINIC | Age: 70
End: 2020-07-19

## 2020-07-20 LAB
ANION GAP SERPL CALCULATED.3IONS-SCNC: 9 MMOL/L (ref 5–18)
BASOPHILS # BLD AUTO: 0 THOU/UL (ref 0–0.2)
BASOPHILS NFR BLD AUTO: 0 % (ref 0–2)
BUN SERPL-MCNC: 18 MG/DL (ref 8–22)
CALCIUM SERPL-MCNC: 9.4 MG/DL (ref 8.5–10.5)
CHLORIDE BLD-SCNC: 104 MMOL/L (ref 98–107)
CO2 SERPL-SCNC: 25 MMOL/L (ref 22–31)
CREAT SERPL-MCNC: 0.81 MG/DL (ref 0.7–1.3)
EOSINOPHIL # BLD AUTO: 0.2 THOU/UL (ref 0–0.4)
EOSINOPHIL NFR BLD AUTO: 2 % (ref 0–6)
ERYTHROCYTE [DISTWIDTH] IN BLOOD BY AUTOMATED COUNT: 11.8 % (ref 11–14.5)
GFR SERPL CREATININE-BSD FRML MDRD: >60 ML/MIN/1.73M2
GLUCOSE BLD-MCNC: 68 MG/DL (ref 70–125)
HCT VFR BLD AUTO: 48.6 % (ref 40–54)
HGB BLD-MCNC: 16.6 G/DL (ref 14–18)
LYMPHOCYTES # BLD AUTO: 2.6 THOU/UL (ref 0.8–4.4)
LYMPHOCYTES NFR BLD AUTO: 39 % (ref 20–40)
MCH RBC QN AUTO: 32.9 PG (ref 27–34)
MCHC RBC AUTO-ENTMCNC: 34.2 G/DL (ref 32–36)
MCV RBC AUTO: 96 FL (ref 80–100)
MONOCYTES # BLD AUTO: 0.5 THOU/UL (ref 0–0.9)
MONOCYTES NFR BLD AUTO: 8 % (ref 2–10)
NEUTROPHILS # BLD AUTO: 3.4 THOU/UL (ref 2–7.7)
NEUTROPHILS NFR BLD AUTO: 51 % (ref 50–70)
PLATELET # BLD AUTO: 222 THOU/UL (ref 140–440)
PMV BLD AUTO: 10.2 FL (ref 8.5–12.5)
POTASSIUM BLD-SCNC: 4 MMOL/L (ref 3.5–5)
RBC # BLD AUTO: 5.04 MILL/UL (ref 4.4–6.2)
SODIUM SERPL-SCNC: 138 MMOL/L (ref 136–145)
VIT B12 SERPL-MCNC: 392 PG/ML (ref 213–816)
WBC: 6.7 THOU/UL (ref 4–11)

## 2020-08-26 ENCOUNTER — AMBULATORY - HEALTHEAST (OUTPATIENT)
Dept: LAB | Facility: CLINIC | Age: 70
End: 2020-08-26

## 2020-08-26 DIAGNOSIS — D75.1 POLYCYTHEMIA: ICD-10-CM

## 2020-08-27 LAB
BASOPHILS # BLD AUTO: 0 THOU/UL (ref 0–0.2)
BASOPHILS NFR BLD AUTO: 1 % (ref 0–2)
EOSINOPHIL # BLD AUTO: 0.1 THOU/UL (ref 0–0.4)
EOSINOPHIL NFR BLD AUTO: 2 % (ref 0–6)
EPO SERPL-ACNC: 10 MU/ML (ref 4–27)
ERYTHROCYTE [DISTWIDTH] IN BLOOD BY AUTOMATED COUNT: 11.9 % (ref 11–14.5)
HCT VFR BLD AUTO: 48.6 % (ref 40–54)
HGB BLD-MCNC: 17 G/DL (ref 14–18)
LAB AP CHARGES (HE HISTORICAL CONVERSION): NORMAL
LYMPHOCYTES # BLD AUTO: 1.8 THOU/UL (ref 0.8–4.4)
LYMPHOCYTES NFR BLD AUTO: 30 % (ref 20–40)
MCH RBC QN AUTO: 33.9 PG (ref 27–34)
MCHC RBC AUTO-ENTMCNC: 35 G/DL (ref 32–36)
MCV RBC AUTO: 97 FL (ref 80–100)
MONOCYTES # BLD AUTO: 0.5 THOU/UL (ref 0–0.9)
MONOCYTES NFR BLD AUTO: 8 % (ref 2–10)
NEUTROPHILS # BLD AUTO: 3.5 THOU/UL (ref 2–7.7)
NEUTROPHILS NFR BLD AUTO: 60 % (ref 50–70)
PATH REPORT.COMMENTS IMP SPEC: NORMAL
PATH REPORT.COMMENTS IMP SPEC: NORMAL
PATH REPORT.FINAL DX SPEC: NORMAL
PATH REPORT.MICROSCOPIC SPEC OTHER STN: ABNORMAL
PATH REPORT.MICROSCOPIC SPEC OTHER STN: NORMAL
PATH REPORT.RELEVANT HX SPEC: NORMAL
PLATELET # BLD AUTO: 229 THOU/UL (ref 140–440)
PMV BLD AUTO: 10.7 FL (ref 8.5–12.5)
RBC # BLD AUTO: 5.02 MILL/UL (ref 4.4–6.2)
WBC: 5.8 THOU/UL (ref 4–11)

## 2021-02-15 ENCOUNTER — COMMUNICATION - HEALTHEAST (OUTPATIENT)
Dept: CARDIOLOGY | Facility: CLINIC | Age: 71
End: 2021-02-15

## 2021-02-15 ENCOUNTER — RECORDS - HEALTHEAST (OUTPATIENT)
Dept: ADMINISTRATIVE | Facility: OTHER | Age: 71
End: 2021-02-15

## 2021-02-15 ENCOUNTER — AMBULATORY - HEALTHEAST (OUTPATIENT)
Dept: CARDIOLOGY | Facility: CLINIC | Age: 71
End: 2021-02-15

## 2021-02-16 ENCOUNTER — OFFICE VISIT - HEALTHEAST (OUTPATIENT)
Dept: CARDIOLOGY | Facility: CLINIC | Age: 71
End: 2021-02-16

## 2021-02-16 DIAGNOSIS — R07.9 CHEST PAIN, UNSPECIFIED TYPE: ICD-10-CM

## 2021-03-08 ENCOUNTER — HOSPITAL ENCOUNTER (OUTPATIENT)
Dept: NUCLEAR MEDICINE | Facility: HOSPITAL | Age: 71
Discharge: HOME | End: 2021-03-08
Attending: INTERNAL MEDICINE

## 2021-03-08 ENCOUNTER — HOSPITAL ENCOUNTER (OUTPATIENT)
Dept: CARDIOLOGY | Facility: HOSPITAL | Age: 71
Discharge: HOME | End: 2021-03-08
Attending: INTERNAL MEDICINE

## 2021-03-08 DIAGNOSIS — R07.9 CHEST PAIN, UNSPECIFIED TYPE: ICD-10-CM

## 2021-03-08 LAB
AORTIC ROOT: 2.9 CM
BSA FOR ECHO PROCEDURE: 2.23 M2
CV ECHO HEIGHT: 69 IN
CV ECHO WEIGHT: 225 LBS
CV STRESS CURRENT BP HE: NORMAL
CV STRESS CURRENT HR HE: 100
CV STRESS CURRENT HR HE: 68
CV STRESS CURRENT HR HE: 73
CV STRESS CURRENT HR HE: 75
CV STRESS CURRENT HR HE: 75
CV STRESS CURRENT HR HE: 76
CV STRESS CURRENT HR HE: 78
CV STRESS CURRENT HR HE: 80
CV STRESS CURRENT HR HE: 81
CV STRESS CURRENT HR HE: 81
CV STRESS CURRENT HR HE: 84
CV STRESS CURRENT HR HE: 84
CV STRESS CURRENT HR HE: 85
CV STRESS CURRENT HR HE: 87
CV STRESS CURRENT HR HE: 88
CV STRESS CURRENT HR HE: 89
CV STRESS CURRENT HR HE: 90
CV STRESS CURRENT HR HE: 98
CV STRESS CURRENT HR HE: 99
CV STRESS DEVIATION TIME HE: NORMAL
CV STRESS ECHO PERCENT HR HE: NORMAL
CV STRESS EXERCISE STAGE HE: NORMAL
CV STRESS FINAL RESTING BP HE: NORMAL
CV STRESS FINAL RESTING HR HE: 75
CV STRESS MAX HR HE: 100
CV STRESS MAX TREADMILL GRADE HE: 0
CV STRESS MAX TREADMILL SPEED HE: 0
CV STRESS PEAK DIA BP HE: NORMAL
CV STRESS PEAK SYS BP HE: NORMAL
CV STRESS PHASE HE: NORMAL
CV STRESS PROTOCOL HE: NORMAL
CV STRESS RESTING PT POSITION HE: NORMAL
CV STRESS ST DEVIATION AMOUNT HE: NORMAL
CV STRESS ST DEVIATION ELEVATION HE: NORMAL
CV STRESS ST EVELATION AMOUNT HE: NORMAL
CV STRESS TEST TYPE HE: NORMAL
CV STRESS TOTAL STAGE TIME MIN 1 HE: NORMAL
DOP CALC LVOT AREA: 2.83 CM2
DOP CALC LVOT DIAMETER: 1.9 CM
DOP CALC LVOT PEAK VEL: 75.9 CM/S
DOP CALC LVOT STROKE VOLUME: 45.9 CM3
DOP CALCLVOT PEAK VEL VTI: 16.2 CM
ECHO EJECTION FRACTION ESTIMATED: 65 %
FRACTIONAL SHORTENING: 44.4 % (ref 28–44)
INTERVENTRICULAR SEPTUM IN END DIASTOLE: 0.7 CM (ref 0.6–1)
IVS/PW RATIO: 1
LA AREA 1: 18.9 CM2
LEFT ATRIUM LENGTH: 5.15 CM
LEFT ATRIUM SIZE: 3.5 CM
LEFT ATRIUM TO AORTIC ROOT RATIO: 1.21 NO UNITS
LEFT VENTRICLE CARDIAC INDEX: 1.5 L/MIN/M2
LEFT VENTRICLE CARDIAC OUTPUT: 3.3 L/MIN
LEFT VENTRICLE HEART RATE: 71 BPM
LEFT VENTRICLE MASS INDEX: 42.9 G/M2
LEFT VENTRICULAR INTERNAL DIMENSION IN DIASTOLE: 4.5 CM (ref 4.2–5.8)
LEFT VENTRICULAR INTERNAL DIMENSION IN SYSTOLE: 2.5 CM (ref 2.5–4)
LEFT VENTRICULAR MASS: 95.7 G
LEFT VENTRICULAR OUTFLOW TRACT MEAN GRADIENT: 1 MMHG
LEFT VENTRICULAR OUTFLOW TRACT MEAN VELOCITY: 52.5 CM/S
LEFT VENTRICULAR OUTFLOW TRACT PEAK GRADIENT: 2 MMHG
LEFT VENTRICULAR POSTERIOR WALL IN END DIASTOLE: 0.7 CM (ref 0.6–1)
LV STROKE VOLUME INDEX: 20.6 ML/M2
MITRAL VALVE E/A RATIO: 1.1
MV AVERAGE E/E' RATIO: 8.6 CM/S
MV DECELERATION TIME: 162 MS
MV E'TISSUE VEL-LAT: 11.2 CM/S
MV E'TISSUE VEL-MED: 9.36 CM/S
MV LATERAL E/E' RATIO: 7.9
MV MEDIAL E/E' RATIO: 9.4
MV PEAK A VELOCITY: 83.9 CM/S
MV PEAK E VELOCITY: 88.4 CM/S
NUC REST DIASTOLIC VOLUME INDEX: 3600 LBS
NUC REST SYSTOLIC VOLUME INDEX: 69 IN
PR MAX PG: 4 MMHG
PR PEAK VELOCITY: 93.7 CM/S
RATE PRESSURE PRODUCT: NORMAL
STRESS ECHO BASELINE DIASTOLIC HE: 84
STRESS ECHO BASELINE HR: 66
STRESS ECHO BASELINE SYSTOLIC BP: 148
STRESS ECHO CALCULATED PERCENT HR: 66 %
STRESS ECHO LAST STRESS DIASTOLIC BP: 88
STRESS ECHO LAST STRESS HR: 87
STRESS ECHO LAST STRESS SYSTOLIC BP: 158
STRESS ECHO TARGET HR: 152
TRICUSPID REGURGITATION PEAK PRESSURE GRADIENT: 34.1 MMHG
TRICUSPID VALVE ANULAR PLANE SYSTOLIC EXCURSION: 2.5 CM
TRICUSPID VALVE PEAK REGURGITANT VELOCITY: 292 CM/S

## 2021-03-08 ASSESSMENT — MIFFLIN-ST. JEOR: SCORE: 1780.97

## 2021-03-10 ENCOUNTER — AMBULATORY - HEALTHEAST (OUTPATIENT)
Dept: CARDIOLOGY | Facility: CLINIC | Age: 71
End: 2021-03-10

## 2021-03-10 DIAGNOSIS — R07.9 CHEST PAIN, UNSPECIFIED TYPE: ICD-10-CM

## 2021-03-10 DIAGNOSIS — R94.39 ABNORMAL NUCLEAR STRESS TEST: ICD-10-CM

## 2021-03-16 ENCOUNTER — HOSPITAL ENCOUNTER (OUTPATIENT)
Dept: CT IMAGING | Facility: CLINIC | Age: 71
Discharge: HOME | End: 2021-03-16
Attending: INTERNAL MEDICINE

## 2021-03-16 DIAGNOSIS — R94.39 ABNORMAL NUCLEAR STRESS TEST: ICD-10-CM

## 2021-03-16 DIAGNOSIS — R07.9 CHEST PAIN, UNSPECIFIED TYPE: ICD-10-CM

## 2021-03-16 LAB
BSA FOR ECHO PROCEDURE: 2.23 M2
CREAT BLD-MCNC: 1 MG/DL (ref 0.7–1.3)
GFR SERPL CREATININE-BSD FRML MDRD: >60 ML/MIN/1.73M2
LEFT VENTRICLE HEART RATE: 73 BPM

## 2021-03-16 ASSESSMENT — MIFFLIN-ST. JEOR: SCORE: 1780.97

## 2021-03-17 ENCOUNTER — AMBULATORY - HEALTHEAST (OUTPATIENT)
Dept: CARDIOLOGY | Facility: CLINIC | Age: 71
End: 2021-03-17

## 2021-03-17 DIAGNOSIS — I25.10 CAD (CORONARY ARTERY DISEASE): ICD-10-CM

## 2021-06-01 NOTE — PROGRESS NOTES
Bedside EEG was performed that included photic stimulation and 3 minutes of hyperventilation. The patient was both awake and asleep during the recording.
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
## 2021-06-02 NOTE — PROGRESS NOTES
NEUROPSYCHOLOGY FEEDBACK NOTE    NAME: Mike Khalil  YOB: 1950     DATE OF EVALUATION: 10/2/2019      SUMMARY OF SESSION:  Mike Khalil is a 68 y.o.,  male with a history of Major Neurocognitive Disorder, who was referred for a cognitive evaluation by Tevin Stearns MD.  Mr. Khalil arrived on time and accompanied by his . We began the session by discussing his experience during the neuropsychometric evaluation.  I provided Mr. Khalil with detailed feedback regarding his performance on cognitive testing and his pattern of cognitive strengths and weaknesses.  I discussed my overall impressions and recommendations and provided the opportunity for Mr. Khalil to ask any questions that he had about the evaluation.  At the end of the session, he indicated that he understood the results and that I had answered all of his questions.  He was provided with my contact information, should any further questions or concerns arise in the future.    Please contact me with any questions regarding the content of this note.     Malick Waldron, PhD, LP, ABPP-CN  Board Certified in [place] [address]  Phone: [phone]    For diagnostic and coding purposes, Mike Khalil has a history of Major Neurocognitive Disorderand was referred for an evaluation of cognitive disorder.  The feedback will be billed with the overall evaluation on 9/26/2019 .

## 2021-06-05 VITALS — HEIGHT: 69 IN | BODY MASS INDEX: 33.33 KG/M2 | WEIGHT: 225 LBS

## 2021-06-05 VITALS — WEIGHT: 225 LBS | BODY MASS INDEX: 33.33 KG/M2 | HEIGHT: 69 IN

## 2021-06-05 VITALS — HEIGHT: 71 IN | BODY MASS INDEX: 29.85 KG/M2

## 2021-06-09 ENCOUNTER — RECORDS - HEALTHEAST (OUTPATIENT)
Dept: ADMINISTRATIVE | Facility: CLINIC | Age: 71
End: 2021-06-09

## 2021-06-15 NOTE — TELEPHONE ENCOUNTER
Wellness Screening Tool  Symptom Screening:  Do you have one of the following NEW symptoms:    Fever (subjective or >100.0)?  No    A new cough?  No    Shortness of breath?  No     Chills? No     New loss of taste or smell? No     Generalized body aches? No     New persistent headache? No     New sore throat? No     Nausea, vomiting, or diarrhea?  No    Within the past 2 weeks, have you been exposed to someone with a known positive illness below:    COVID-19 (known or suspected)?  Not sure    Chicken pox?  No    Mealses?  No    Pertussis?  No    Staff member tested positive for Covid but it is not known if patient was around them or not. Sent this to RN, Sylvia REDD, to follow up.     Gabriela Nolen, CMA

## 2021-06-15 NOTE — PROGRESS NOTES
Review of systems done with patient over the phone. Positive for chest pain. All other review of systems within normal limits. He was unable to verify his medications as the home does them for him. He was unable to get his vitals today.

## 2021-06-15 NOTE — TELEPHONE ENCOUNTER
Spoke to nurse, informed her patients appt tomorrow has been changed to a virtual visit due to possible Covid exposure.     Gabriela Nolen CMA

## 2021-06-30 NOTE — PROGRESS NOTES
"Progress Notes by Manan Hall MD at 2/16/2021  1:50 PM     Author: Manan Hall MD Service: -- Author Type: Physician    Filed: 2/16/2021  2:21 PM Encounter Date: 2/16/2021 Status: Signed    : Manan Hall MD (Physician)       Consultation - [facility] Heart care        The patient has been notified of following:     \"This telephone visit will be conducted via a call between you and your physician/provider. We have found that certain health care needs can be provided without the need for a physical exam.  This service lets us provide the care you need with a phone conversation.  If a prescription is necessary we can send it directly to your pharmacy.  If lab work is needed we can place an order for that and you can then stop by our lab to have the test done at a later time. If during the course of the call the physician/provider feels a telephone visit is not appropriate, you will not be charged for this service.\" Verbal consent has been obtained for this service by care team member:         HEART CARE PHONE ENCOUNTER        The patient has chosen to have the visit conducted as a telephone visit, to reduce risk of exposure given the current status of Coronavirus in our community. This telephone visit is being conducted via a call between the patient and physician/provider. Health care needs are being provided without a physical exam.     Assessment/Recommendations   Assessment:    Chest pain    Plan:  1.       Follow Up Plan: Follow up in   I have reviewed the note as documented.  This accurately captures the substance of my conversation with the patient.    Total time of call between patient and provider was 30 minutes   Start Time:155  Stop Time:225       History of Present Illness/Subjective    Mike Khalil is a 70 y.o. male who is being evaluated via a billable telephone visit.        I have reviewed and updated the patient's Past Medical History, Social History, Family History and " Medication List.     Physical Examination not performed given phone encounter Review of Systems                                                Medical History  Surgical History Family History Social History   No past medical history on file. No past surgical history on file. No family history on file. Social History     Socioeconomic History   ? Marital status: Single     Spouse name: Not on file   ? Number of children: Not on file   ? Years of education: Not on file   ? Highest education level: Not on file   Occupational History   ? Not on file   Social Needs   ? Financial resource strain: Not on file   ? Food insecurity     Worry: Not on file     Inability: Not on file   ? Transportation needs     Medical: Not on file     Non-medical: Not on file   Tobacco Use   ? Smoking status: Not on file   Substance and Sexual Activity   ? Alcohol use: Not on file   ? Drug use: Not on file   ? Sexual activity: Not on file   Lifestyle   ? Physical activity     Days per week: Not on file     Minutes per session: Not on file   ? Stress: Not on file   Relationships   ? Social connections     Talks on phone: Not on file     Gets together: Not on file     Attends L[employer] day service: Not on file     Active member of club or organization: Not on file     Attends meetings of clubs or organizations: Not on file     Relationship status: Not on file   ? Intimate partner violence     Fear of current or ex partner: Not on file     Emotionally abused: Not on file     Physically abused: Not on file     Forced sexual activity: Not on file   Other Topics Concern   ? Not on file   Social History Narrative   ? Not on file          Medications  Allergies   Current Outpatient Medications   Medication Sig Dispense Refill   ? acetaminophen (TYLENOL) 500 MG tablet      ? cholecalciferol, vitamin D3, 50 mcg (2,000 unit) Tab      ?  mg capsule      ? donepeziL (ARICEPT) 10 MG tablet      ? fluticasone propionate (FLONASE) 50 mcg/actuation nasal  spray      ? gatifloxacin (ZYMAXID) 0.5 % Drop ophthalmic solution      ? ipratropium (ATROVENT) 0.03 % nasal spray      ? latanoprost (XALATAN) 0.005 % ophthalmic solution 1 drop.     ? lisinopriL (PRINIVIL,ZESTRIL) 10 MG tablet      ? pravastatin (PRAVACHOL) 20 MG tablet      ? prednisoLONE acetate (PRED-FORTE) 1 % ophthalmic suspension      ? SENNA 8.6 mg tablet        No current facility-administered medications for this visit.     Allergies   Allergen Reactions   ? Celecoxib Other (See Comments)     Felt dizzy and lightheaded after taking.    ? Rofecoxib Other (See Comments)     Felt dizzy and lightheaded.          Lab Results    Chemistry/lipid CBC Cardiac Enzymes/BNP/TSH/INR   Lab Results   Component Value Date    CHOL 231 (H) 2019    HDL 51 2019    LDLCALC 156 (H) 2019    TRIG 119 2019    CREATININE 0.81 2020    BUN 18 2020    K 4.0 2020     2020     2020    CO2 25 2020    Lab Results   Component Value Date    WBC 5.8 2020    HGB 17.0 2020    HCT 48.6 2020    MCV 97 2020     2020    Lab Results   Component Value Date    TSH 1.18 2019        Manan Hall                                                 Mike Khalil,  1950, MRN [id number]    PCP: Josue Mascorro MD, [phone]    Assessment and Plan: Chest pain atypical    Recommendations: Arrange stress, holter, echo  unfortunately poor historian so difficult to discern nature of these sx. for now no specific medication recommendations until additional testing has been completed.  Discussed with caregiver.  We will see if we can arrange for this test performed outpatient basis on the same day.    Chief Complaint:  Chest pain    HPI:  We have been requested by Dr mascorro to evaluate Mike Whittingtonzhaneerica for consultation who is a  70 y.o. year old male for above chief complaint.  Hx: New pt never seen before here asked to see for   [facility] primary care.  In assisted living.  Chest pain  Hx froma care giver as pt unclear on his hx  Similar to sx in distant past.  Chest pains went ER testing felt not cardiac.  Now his sx is chest where you bend and can place finger on spot.   Bottom of rib cage  Began 4 years ago and has recurrd occasionally  Comes and goes spontaneous  Duration of sx unclear highly variable.  No associate sx  Has HTN HLD no diabetes smoking FH CVA MI F  Lives A/L permanently    Tries to walk regularly and occasionally notes CP that resolves.      Current Outpatient Medications:   ?  acetaminophen (TYLENOL) 500 MG tablet, , Disp: , Rfl:   ?  cholecalciferol, vitamin D3, 50 mcg (2,000 unit) Tab, , Disp: , Rfl:   ?   mg capsule, , Disp: , Rfl:   ?  donepeziL (ARICEPT) 10 MG tablet, , Disp: , Rfl:   ?  fluticasone propionate (FLONASE) 50 mcg/actuation nasal spray, , Disp: , Rfl:   ?  gatifloxacin (ZYMAXID) 0.5 % Drop ophthalmic solution, , Disp: , Rfl:   ?  ipratropium (ATROVENT) 0.03 % nasal spray, , Disp: , Rfl:   ?  latanoprost (XALATAN) 0.005 % ophthalmic solution, 1 drop., Disp: , Rfl:   ?  lisinopriL (PRINIVIL,ZESTRIL) 10 MG tablet, , Disp: , Rfl:   ?  pravastatin (PRAVACHOL) 20 MG tablet, , Disp: , Rfl:   ?  prednisoLONE acetate (PRED-FORTE) 1 % ophthalmic suspension, , Disp: , Rfl:   ?  SENNA 8.6 mg tablet, , Disp: , Rfl:   Medical History  There are no active non-hospital problems to display for this patient.    No past medical history on file.    Surgical History  He  has no past surgical history on file.    Social History  Reviewed, and he    Smoking status reviewed.  Social history othrwise not contributory to HPI.  Allergies  Allergies   Allergen Reactions   ? Celecoxib Other (See Comments)     Felt dizzy and lightheaded after taking.    ? Rofecoxib Other (See Comments)     Felt dizzy and lightheaded.        Family History  Reviewed, and family history is not on file.  Extended Emergency Contact  Information  Primary Emergency Contact: VILMA SUMNER Phone: [phone]  Relation: Friend  Family history otherwise negative or not conributory to HPI.    Psychosocial Needs  Social History     Social History Narrative   ? Not on file     Additional psychosocial needs reviewed per nursing assessment.    Prior to Admission Medications  (Not in a hospital admission)      Review of Systems:  Pertinent items are noted in HPI.  A 12 point comprehensive review of systems was negative except as noted.  Review of systems is negative except for HPI      Pertinent Labs  Lab Results: personally reviewed.   No visits with results within 2 Month(s) from this visit.   Latest known visit with results is:   Lab on 08/26/2020   Component Date Value   ? Pathology, Smear Review 08/26/2020 See Separate Pathology Report*   ? WBC 08/26/2020 5.8    ? RBC 08/26/2020 5.02    ? Hemoglobin 08/26/2020 17.0    ? Hematocrit 08/26/2020 48.6    ? MCV 08/26/2020 97    ? MCH 08/26/2020 33.9    ? MCHC 08/26/2020 35.0    ? RDW 08/26/2020 11.9    ? Platelets 08/26/2020 229    ? MPV 08/26/2020 10.7    ? Neutrophils % 08/26/2020 60    ? Lymphocytes % 08/26/2020 30    ? Monocytes % 08/26/2020 8    ? Eosinophils % 08/26/2020 2    ? Basophils % 08/26/2020 1    ? Neutrophils Absolute 08/26/2020 3.5    ? Lymphocytes Absolute 08/26/2020 1.8    ? Monocytes Absolute 08/26/2020 0.5    ? Eosinophils Absolute 08/26/2020 0.1    ? Basophils Absolute 08/26/2020 0.0    ? Erythropoietin 08/26/2020 10    ? Case Report 08/26/2020                      Value:Peripheral Blood Morphology Report                Case: [id number]                                   Authorizing Provider:  Shahla Barbosa,   Collected:           08/26/2020 1139                                     CNP                                                                          Ordering Location:     [facility]      Received:            08/27/2020 1101                                      Outpatient Lab                                                               Pathologist:           Dao Crabtree MD                                                        Specimen:    Peripheral Blood                                                                          ? Final Diagnosis 08/26/2020                      Value:PERIPHERAL BLOOD:     - NO SIGNIFICANT ABNORMALITIES   ? Comment 08/26/2020                      Value:The clinical history has been reviewed.   ? Clinical Information 08/26/2020                      Value:D75.1   ? Peripheral Smear 08/26/2020                      Value:Red blood cells are normal in number and overall normochromic and normocytic. Anisopoikilocytosis, polychromasia, and rouleaux formation are not prominent.    The white blood cell count and differential appear as reported on the CBC. Leukocytes are normal in number and appearance, consisting predominantly of segmented and band neutrophils with fewer numbers of lymphocytes and monocytes. No blasts or dysplastic changes are identified.    Platelets are normal in number and appearance.   ? Charges 08/26/2020                      Value:CPT: 36384  ICD-10: D75.1     Lab on 08/26/2020   Component Date Value   ? Pathology, Smear Review 08/26/2020 See Separate Pathology Report*   ? WBC 08/26/2020 5.8    ? RBC 08/26/2020 5.02    ? Hemoglobin 08/26/2020 17.0    ? Hematocrit 08/26/2020 48.6    ? MCV 08/26/2020 97    ? MCH 08/26/2020 33.9    ? MCHC 08/26/2020 35.0    ? RDW 08/26/2020 11.9    ? Platelets 08/26/2020 229    ? MPV 08/26/2020 10.7    ? Neutrophils % 08/26/2020 60    ? Lymphocytes % 08/26/2020 30    ? Monocytes % 08/26/2020 8    ? Eosinophils % 08/26/2020 2    ? Basophils % 08/26/2020 1    ? Neutrophils Absolute 08/26/2020 3.5    ? Lymphocytes Absolute 08/26/2020 1.8    ? Monocytes Absolute 08/26/2020 0.5    ? Eosinophils Absolute 08/26/2020 0.1    ? Basophils Absolute 08/26/2020 0.0    ? Erythropoietin 08/26/2020 10     ? Case Report 08/26/2020                      Value:Peripheral Blood Morphology Report                Case: [id number]                                   Authorizing Provider:  Shahla Barbosa,   Collected:           08/26/2020 1139                                     CNP                                                                          Ordering Location:     [facility]      Received:            08/27/2020 1101                                     Outpatient Lab                                                               Pathologist:           Dao Crabtree MD                                                        Specimen:    Peripheral Blood                                                                          ? Final Diagnosis 08/26/2020                      Value:PERIPHERAL BLOOD:     - NO SIGNIFICANT ABNORMALITIES   ? Comment 08/26/2020                      Value:The clinical history has been reviewed.   ? Clinical Information 08/26/2020                      Value:D75.1   ? Peripheral Smear 08/26/2020                      Value:Red blood cells are normal in number and overall normochromic and normocytic. Anisopoikilocytosis, polychromasia, and rouleaux formation are not prominent.    The white blood cell count and differential appear as reported on the CBC. Leukocytes are normal in number and appearance, consisting predominantly of segmented and band neutrophils with fewer numbers of lymphocytes and monocytes. No blasts or dysplastic changes are identified.    Platelets are normal in number and appearance.   ? Charges 08/26/2020                      Value:CPT: 95700  ICD-10: D75.1       Pertinent Radiology  Radiology Results: See Report  EKG Results: personally reviewed.  and See Report       Current Outpatient Medications:   ?  acetaminophen (TYLENOL) 500 MG tablet, , Disp: , Rfl:   ?  cholecalciferol, vitamin D3, 50 mcg (2,000 unit) Tab, , Disp: , Rfl:   ?   mg  capsule, , Disp: , Rfl:   ?  donepeziL (ARICEPT) 10 MG tablet, , Disp: , Rfl:   ?  fluticasone propionate (FLONASE) 50 mcg/actuation nasal spray, , Disp: , Rfl:   ?  gatifloxacin (ZYMAXID) 0.5 % Drop ophthalmic solution, , Disp: , Rfl:   ?  ipratropium (ATROVENT) 0.03 % nasal spray, , Disp: , Rfl:   ?  latanoprost (XALATAN) 0.005 % ophthalmic solution, 1 drop., Disp: , Rfl:   ?  lisinopriL (PRINIVIL,ZESTRIL) 10 MG tablet, , Disp: , Rfl:   ?  pravastatin (PRAVACHOL) 20 MG tablet, , Disp: , Rfl:   ?  prednisoLONE acetate (PRED-FORTE) 1 % ophthalmic suspension, , Disp: , Rfl:   ?  SENNA 8.6 mg tablet, , Disp: , Rfl:

## 2021-12-10 ENCOUNTER — TELEPHONE (OUTPATIENT)
Dept: CARDIOLOGY | Facility: CLINIC | Age: 71
End: 2021-12-10

## 2021-12-10 DIAGNOSIS — E78.2 MIXED HYPERLIPIDEMIA: Primary | ICD-10-CM

## 2021-12-10 RX ORDER — ROSUVASTATIN CALCIUM 40 MG/1
40 TABLET, COATED ORAL DAILY
Qty: 90 TABLET | Refills: 1
Start: 2021-12-10

## 2021-12-10 NOTE — TELEPHONE ENCOUNTER
Spoke with pharm and Crestor 40 mg tab was sent instead of 2 tabs of 20 mg. Will update med sheet.

## 2021-12-10 NOTE — TELEPHONE ENCOUNTER
----- Message from Bailee Jackson RN sent at 12/10/2021  9:49 AM CST -----  Regarding: FW: PTK PATIENT    ----- Message -----  From: Lily Corona  Sent: 12/9/2021   5:01 PM CST  To: Bailee Jackson RN  Subject: PTK PATIENT                                      General phone call:    Caller: SUMMER FROM [facility]    Primary cardiologist: PTK     Detailed reason for call: rosuvastatin (CRESTOR) 20 MG tablet, HAS BEEN CHANGED TO 1 40MG. PER INSURANCE    Best phone number: [phone]    Best time to contact: ANY    Ok to leave a detailedmessage? YES    Device? NO    Additional Info:

## 2022-05-04 ENCOUNTER — LAB (OUTPATIENT)
Dept: LAB | Facility: HOSPITAL | Age: 72
End: 2022-05-04
Payer: COMMERCIAL

## 2022-05-04 DIAGNOSIS — R46.89 BEHAVIORAL CHANGE: Primary | ICD-10-CM

## 2022-05-04 LAB
ALBUMIN UR-MCNC: 20 MG/DL
APPEARANCE UR: CLEAR
BILIRUB UR QL STRIP: NEGATIVE
COLOR UR AUTO: YELLOW
GLUCOSE UR STRIP-MCNC: NEGATIVE MG/DL
HGB UR QL STRIP: NEGATIVE
HYALINE CASTS: 2 /LPF
KETONES UR STRIP-MCNC: ABNORMAL MG/DL
LEUKOCYTE ESTERASE UR QL STRIP: NEGATIVE
MUCOUS THREADS #/AREA URNS LPF: PRESENT /LPF
NITRATE UR QL: NEGATIVE
PH UR STRIP: 5.5 [PH] (ref 5–7)
RBC URINE: <1 /HPF
SP GR UR STRIP: 1.04 (ref 1–1.03)
SQUAMOUS EPITHELIAL: <1 /HPF
UROBILINOGEN UR STRIP-MCNC: 8 MG/DL
WBC URINE: 2 /HPF

## 2022-05-04 PROCEDURE — 81001 URINALYSIS AUTO W/SCOPE: CPT
